# Patient Record
Sex: FEMALE | Race: BLACK OR AFRICAN AMERICAN | NOT HISPANIC OR LATINO | URBAN - METROPOLITAN AREA
[De-identification: names, ages, dates, MRNs, and addresses within clinical notes are randomized per-mention and may not be internally consistent; named-entity substitution may affect disease eponyms.]

---

## 2018-10-30 ENCOUNTER — EMERGENCY (EMERGENCY)
Facility: HOSPITAL | Age: 63
LOS: 0 days | Discharge: HOME | End: 2018-10-31
Attending: EMERGENCY MEDICINE
Payer: COMMERCIAL

## 2018-10-30 VITALS
HEIGHT: 66 IN | SYSTOLIC BLOOD PRESSURE: 160 MMHG | RESPIRATION RATE: 16 BRPM | DIASTOLIC BLOOD PRESSURE: 87 MMHG | HEART RATE: 63 BPM | OXYGEN SATURATION: 100 % | WEIGHT: 173.94 LBS | TEMPERATURE: 98 F

## 2018-10-30 LAB
ALBUMIN SERPL ELPH-MCNC: 4.2 G/DL — SIGNIFICANT CHANGE UP (ref 3.3–5)
ALP SERPL-CCNC: 78 U/L — SIGNIFICANT CHANGE UP (ref 40–120)
ALT FLD-CCNC: 37 U/L — SIGNIFICANT CHANGE UP (ref 12–78)
ANION GAP SERPL CALC-SCNC: 8 MMOL/L — SIGNIFICANT CHANGE UP (ref 5–17)
APTT BLD: 31.5 SEC — SIGNIFICANT CHANGE UP (ref 28.5–37)
AST SERPL-CCNC: 30 U/L — SIGNIFICANT CHANGE UP (ref 15–37)
BILIRUB SERPL-MCNC: 0.4 MG/DL — SIGNIFICANT CHANGE UP (ref 0.2–1.2)
BUN SERPL-MCNC: 16 MG/DL — SIGNIFICANT CHANGE UP (ref 7–23)
CALCIUM SERPL-MCNC: 9.5 MG/DL — SIGNIFICANT CHANGE UP (ref 8.5–10.1)
CHLORIDE SERPL-SCNC: 107 MMOL/L — SIGNIFICANT CHANGE UP (ref 96–108)
CK MB BLD-MCNC: 1.8 % — SIGNIFICANT CHANGE UP (ref 0–3.5)
CK MB BLD-MCNC: 1.8 % — SIGNIFICANT CHANGE UP (ref 0–3.5)
CK MB CFR SERPL CALC: 10.5 NG/ML — HIGH (ref 0.5–3.6)
CK MB CFR SERPL CALC: 8.6 NG/ML — HIGH (ref 0.5–3.6)
CK SERPL-CCNC: 469 U/L — HIGH (ref 26–192)
CK SERPL-CCNC: 583 U/L — HIGH (ref 26–192)
CO2 SERPL-SCNC: 27 MMOL/L — SIGNIFICANT CHANGE UP (ref 22–31)
CREAT SERPL-MCNC: 0.73 MG/DL — SIGNIFICANT CHANGE UP (ref 0.5–1.3)
D DIMER BLD IA.RAPID-MCNC: <150 NG/ML DDU — SIGNIFICANT CHANGE UP
GLUCOSE SERPL-MCNC: 76 MG/DL — SIGNIFICANT CHANGE UP (ref 70–99)
HCT VFR BLD CALC: 40.3 % — SIGNIFICANT CHANGE UP (ref 34.5–45)
HGB BLD-MCNC: 13.4 G/DL — SIGNIFICANT CHANGE UP (ref 11.5–15.5)
INR BLD: 1.2 RATIO — HIGH (ref 0.88–1.16)
MCHC RBC-ENTMCNC: 30.8 PG — SIGNIFICANT CHANGE UP (ref 27–34)
MCHC RBC-ENTMCNC: 33.3 GM/DL — SIGNIFICANT CHANGE UP (ref 32–36)
MCV RBC AUTO: 92.6 FL — SIGNIFICANT CHANGE UP (ref 80–100)
NRBC # BLD: 0 /100 WBCS — SIGNIFICANT CHANGE UP (ref 0–0)
PLATELET # BLD AUTO: 214 K/UL — SIGNIFICANT CHANGE UP (ref 150–400)
POTASSIUM SERPL-MCNC: 3.7 MMOL/L — SIGNIFICANT CHANGE UP (ref 3.5–5.3)
POTASSIUM SERPL-SCNC: 3.7 MMOL/L — SIGNIFICANT CHANGE UP (ref 3.5–5.3)
PROT SERPL-MCNC: 9.2 GM/DL — HIGH (ref 6–8.3)
PROTHROM AB SERPL-ACNC: 13.1 SEC — HIGH (ref 10–12.9)
RBC # BLD: 4.35 M/UL — SIGNIFICANT CHANGE UP (ref 3.8–5.2)
RBC # FLD: 12.8 % — SIGNIFICANT CHANGE UP (ref 10.3–14.5)
SODIUM SERPL-SCNC: 142 MMOL/L — SIGNIFICANT CHANGE UP (ref 135–145)
TROPONIN I SERPL-MCNC: <.015 NG/ML — SIGNIFICANT CHANGE UP (ref 0.01–0.04)
TROPONIN I SERPL-MCNC: <.015 NG/ML — SIGNIFICANT CHANGE UP (ref 0.01–0.04)
WBC # BLD: 6.52 K/UL — SIGNIFICANT CHANGE UP (ref 3.8–10.5)
WBC # FLD AUTO: 6.52 K/UL — SIGNIFICANT CHANGE UP (ref 3.8–10.5)

## 2018-10-30 PROCEDURE — 71046 X-RAY EXAM CHEST 2 VIEWS: CPT | Mod: 26

## 2018-10-30 PROCEDURE — 93010 ELECTROCARDIOGRAM REPORT: CPT

## 2018-10-30 PROCEDURE — 99284 EMERGENCY DEPT VISIT MOD MDM: CPT

## 2018-10-30 NOTE — ED PROVIDER NOTE - ATTENDING CONTRIBUTION TO CARE
62yoF; with no signif pmh; now p/w chest pain--left sided chest pain, sharp, intermittent, sticking sensation, lasting few seconds, non-exertional, nonpleuritic, occurred every few days in past 3 weeks. denies any associated sob, palpitations, lightheadedness, nausea, numbness/tingling. denies focal weakness. denies headache. denies abd pain.  EXAM--  General:     NAD, well-nourished, well-appearing  Head:     NC/AT, EOMI, oral mucosa moist  Neck:     trachea midline  Lungs:     CTA b/l, no w/r/r  CVS:     S1S2, RRR, no m/g/r  CHEST WALL: ttp @ left upper chest, no crepitus  Abd:     +BS, s/nt/nd, no organomegaly  Ext:    2+ radial and pedal pulses, no c/c/e  Neuro: AAOx3, no sensory/motor deficits  A/P: 62yoF with atypical chest pain  initial plan of care: labs, ekg, cardiac monitor, pulse ox  clinical course in ED: 62yoF; with no signif pmh; now p/w chest pain--left sided chest pain, sharp, intermittent, sticking sensation, lasting few seconds, non-exertional, nonpleuritic, occurred every few days in past 3 weeks. denies any associated sob, palpitations, lightheadedness, nausea, numbness/tingling. denies focal weakness. denies headache. denies abd pain.  EXAM--  General:     NAD, well-nourished, well-appearing  Head:     NC/AT, EOMI, oral mucosa moist  Neck:     trachea midline  Lungs:     CTA b/l, no w/r/r  CVS:     S1S2, RRR, no m/g/r  CHEST WALL: ttp @ left upper chest, no crepitus  Abd:     +BS, s/nt/nd, no organomegaly  Ext:    2+ radial and pedal pulses, no c/c/e  Neuro: AAOx3, no sensory/motor deficits  A/P: 62yoF with atypical chest pain  initial plan of care: labs, ekg, cardiac monitor, pulse ox  clinical course in ED: normal labs, normal cardiac monitoring, f/up with pmd and cardiology

## 2018-10-30 NOTE — ED PROVIDER NOTE - OBJECTIVE STATEMENT
62F otherwise healthy presents to the ER with chest pain intermittently x 2-3 weeks. Most recently had an episode of chest pain this afternoon at noon. She describes the pain as left sided, non radiating, sharp, lasting seconds. Pain is not pleuritic or exertional. She has no associated shortness of breath, nausea, vomiting, dizziness, diaphoresis, leg swelling. No cough or fever. She has no history of cardiac disease. She went to [place] in August, otherwise no recent travel, surgery or immobilization. No history of DVT/PE.

## 2018-10-30 NOTE — ED ADULT NURSE NOTE - NSIMPLEMENTINTERV_GEN_ALL_ED
Implemented All Universal Safety Interventions:  [place] to call system. Call bell, personal items and telephone within reach. Instruct patient to call for assistance. Room bathroom lighting operational. Non-slip footwear when patient is off stretcher. Physically safe environment: no spills, clutter or unnecessary equipment. Stretcher in lowest position, wheels locked, appropriate side rails in place.

## 2018-10-31 VITALS
OXYGEN SATURATION: 98 % | DIASTOLIC BLOOD PRESSURE: 71 MMHG | HEART RATE: 57 BPM | RESPIRATION RATE: 14 BRPM | SYSTOLIC BLOOD PRESSURE: 125 MMHG

## 2018-11-01 DIAGNOSIS — R07.9 CHEST PAIN, UNSPECIFIED: ICD-10-CM

## 2020-10-15 ENCOUNTER — RESULT REVIEW (OUTPATIENT)
Age: 65
End: 2020-10-15

## 2022-05-02 ENCOUNTER — APPOINTMENT (OUTPATIENT)
Dept: PULMONOLOGY | Facility: CLINIC | Age: 67
End: 2022-05-02
Payer: COMMERCIAL

## 2022-05-02 VITALS
SYSTOLIC BLOOD PRESSURE: 169 MMHG | DIASTOLIC BLOOD PRESSURE: 89 MMHG | OXYGEN SATURATION: 98 % | TEMPERATURE: 98.2 F | HEART RATE: 69 BPM

## 2022-05-02 PROCEDURE — 99204 OFFICE O/P NEW MOD 45 MIN: CPT | Mod: 25

## 2022-05-02 PROCEDURE — ZZZZZ: CPT

## 2022-05-02 PROCEDURE — 94010 BREATHING CAPACITY TEST: CPT

## 2022-05-02 PROCEDURE — 94726 PLETHYSMOGRAPHY LUNG VOLUMES: CPT

## 2022-05-02 PROCEDURE — 94729 DIFFUSING CAPACITY: CPT

## 2022-05-02 PROCEDURE — 88738 HGB QUANT TRANSCUTANEOUS: CPT

## 2022-05-02 PROCEDURE — 36415 COLL VENOUS BLD VENIPUNCTURE: CPT

## 2022-05-02 NOTE — ASSESSMENT
[FreeTextEntry1] : Advised her to do chest x-ray as scheduled.\par Venipuncture with labs drawn in office\par Start Duloxetine 20 mg p.o. daily for atypical chest pain/fibromyalgia\par Advised her to follow with you from hypertension perspective.\par Return for pulmonary follow-up in 2 weeks.

## 2022-05-02 NOTE — PROCEDURE
[FreeTextEntry1] : Pulmonary Function Test performed in my office today: Spirometry: Within normal limits; Lung Volume: Within normal limits; Resistance: Within normal limits; diffusion: Within normal limits.\par

## 2022-05-02 NOTE — REVIEW OF SYSTEMS
[Chest Discomfort] : chest discomfort [Negative] : Endocrine [Fatigue] : fatigue [Dyspnea] : dyspnea

## 2022-05-02 NOTE — CONSULT LETTER
[Dear  ___] : Dear  [unfilled], [Courtesy Letter:] : I had the pleasure of seeing your patient, [unfilled], in my office today. [Please see my note below.] : Please see my note below. [Consult Closing:] : Thank you very much for allowing me to participate in the care of this patient.  If you have any questions, please do not hesitate to contact me. [Sincerely,] : Sincerely, [FreeTextEntry3] : Dr. Denise Pastor

## 2022-05-02 NOTE — DISCUSSION/SUMMARY
[FreeTextEntry1] : Nydia is a patient with atypical right-sided chest pain for the last 6 months, could be secondary to fibromyalgia, rule out PE (much less likely).  Secondly, she is a patient with history of hypertension.

## 2022-05-02 NOTE — HISTORY OF PRESENT ILLNESS
[TextBox_4] : Nydia is a pleasant 66-year-old female with history of hypertension, she came in complaining of atypical right-sided chest pain shooting to the left for the last 6 months, she also has mild fatigue and shortness of breath, but no fever or chills\par She had undergone unremarkable chest x-ray in the past, and is scheduled for another chest x-ray at [facility] in the next several days.  \par She is a non smoker.\par

## 2022-05-03 ENCOUNTER — OUTPATIENT (OUTPATIENT)
Dept: OUTPATIENT SERVICES | Facility: HOSPITAL | Age: 67
LOS: 1 days | End: 2022-05-03
Payer: MEDICARE

## 2022-05-03 ENCOUNTER — APPOINTMENT (OUTPATIENT)
Dept: CT IMAGING | Facility: CLINIC | Age: 67
End: 2022-05-03
Payer: MEDICARE

## 2022-05-03 DIAGNOSIS — R07.89 OTHER CHEST PAIN: ICD-10-CM

## 2022-05-03 LAB
ANION GAP SERPL CALC-SCNC: 12 MMOL/L
BASOPHILS # BLD AUTO: 0.02 K/UL
BASOPHILS NFR BLD AUTO: 0.5 %
BUN SERPL-MCNC: 15 MG/DL
CALCIUM SERPL-MCNC: 9.7 MG/DL
CHLORIDE SERPL-SCNC: 109 MMOL/L
CO2 SERPL-SCNC: 25 MMOL/L
CREAT SERPL-MCNC: 0.6 MG/DL
DEPRECATED D DIMER PPP IA-ACNC: 376 NG/ML DDU
EGFR: 100 ML/MIN/1.73M2
EOSINOPHIL # BLD AUTO: 0.03 K/UL
EOSINOPHIL NFR BLD AUTO: 0.7 %
GLUCOSE SERPL-MCNC: 96 MG/DL
HCT VFR BLD CALC: 38.5 %
HGB BLD-MCNC: 12.2 G/DL
IMM GRANULOCYTES NFR BLD AUTO: 0.5 %
LYMPHOCYTES # BLD AUTO: 1.66 K/UL
LYMPHOCYTES NFR BLD AUTO: 37.6 %
MAN DIFF?: NORMAL
MCHC RBC-ENTMCNC: 30.7 PG
MCHC RBC-ENTMCNC: 31.7 GM/DL
MCV RBC AUTO: 96.7 FL
MONOCYTES # BLD AUTO: 0.3 K/UL
MONOCYTES NFR BLD AUTO: 6.8 %
NEUTROPHILS # BLD AUTO: 2.39 K/UL
NEUTROPHILS NFR BLD AUTO: 53.9 %
PLATELET # BLD AUTO: 189 K/UL
POCT - HEMOGLOBIN (HGB), QUANTITATIVE, TRANSCUTANEOUS: 13.3
POTASSIUM SERPL-SCNC: 4.8 MMOL/L
RBC # BLD: 3.98 M/UL
RBC # FLD: 13.5 %
SODIUM SERPL-SCNC: 146 MMOL/L
WBC # FLD AUTO: 4.42 K/UL

## 2022-05-03 PROCEDURE — 71275 CT ANGIOGRAPHY CHEST: CPT | Mod: 26,MH

## 2022-05-03 PROCEDURE — 71275 CT ANGIOGRAPHY CHEST: CPT

## 2022-05-10 LAB — TOTAL IGE SMQN RAST: 121 KU/L

## 2022-05-23 ENCOUNTER — LABORATORY RESULT (OUTPATIENT)
Age: 67
End: 2022-05-23

## 2022-05-23 ENCOUNTER — APPOINTMENT (OUTPATIENT)
Dept: PULMONOLOGY | Facility: CLINIC | Age: 67
End: 2022-05-23
Payer: MEDICARE

## 2022-05-23 VITALS — HEART RATE: 67 BPM | SYSTOLIC BLOOD PRESSURE: 167 MMHG | DIASTOLIC BLOOD PRESSURE: 80 MMHG | OXYGEN SATURATION: 98 %

## 2022-05-23 PROCEDURE — 99214 OFFICE O/P EST MOD 30 MIN: CPT | Mod: 25

## 2022-05-23 PROCEDURE — 36415 COLL VENOUS BLD VENIPUNCTURE: CPT

## 2022-05-23 NOTE — REVIEW OF SYSTEMS
[Fatigue] : fatigue [Dyspnea] : dyspnea [Chest Discomfort] : chest discomfort [Negative] : Endocrine

## 2022-05-24 NOTE — DISCUSSION/SUMMARY
[FreeTextEntry1] : Improving atypical right-sided chest pain likely secondary to fibromyalgia.  Left upper lobe lung nodule, need to definitively rule out malignancy, rule out postinflammatory changes.

## 2022-05-24 NOTE — PROCEDURE
[FreeTextEntry1] : \par   CT Angio Chest PE Protocol w/ IV Cont             Final\par \par No Documents Attached\par \par \par \par \par   EXAM: [id number] - CT ANGIO CHEST PULM [facility]  - ORDERED BY: CORRIE ACUNA\par \par \par PROCEDURE DATE:  05/03/2022\par \par \par \par INTERPRETATION:  Clinical information: Atypical chest pain, elevated d-dimer, assess for PE\par \par TECHNIQUE: A volumetric acquisition of the chest was obtained from the thoracic inlet to the upper abdomen during dynamic administration of intravenous contrast. 3D MIP images were provided.\par \par COMPARISON: No prior chest CT for comparison\par \par FINDINGS:\par \par CTA: The study is technically adequate with a good contrast bolus to the pulmonary arteries. There are no filling defects in the pulmonary artery or its branches. The cardiac chambers appear normal in size. There is mild coronary artery calcification. There is no pericardial effusion. The aorta is normal in caliber. There is a common trunk of the brachiocephalic and left common carotid arteries, normal variant.\par \par Lungs/Airways/Pleura: There is a 1.3 x 1.1 cm mildly lobulated subpleural left lower lobe nodule on image 347 series 3. Central airways are patent. No pleural effusion.\par \par Mediastinum/Lymph nodes: No thoracic adenopathy. There is enlargement left thyroid lobe contains ill-defined areas of low-attenuation\par \par Upper Abdomen: There is partially imaged 1.3 cm right renal artery aneurysm.\par \par Bones and Soft Tissues: No aggressive osseous lesions.\par \par IMPRESSION:\par \par 1.  No pulmonary thromboembolism\par \par 2.  Noncalcified 1.3 x 1.1 cm mildly lobulated left lower lobe nodule. Consider further evaluation with PET/CT, sampling or short-term follow-up noncontrast chest CT.\par \par 3.  Partially imaged 1.3 cm right renal artery aneurysm.\par \par --- End of Report ---\par \par \par \par \par \par \par ELICEO RAMOS M.D., Attending Radiologist\par This document has been electronically signed. May  3 2022  6:14PM\par \par  \par \par  Ordered by: ACUNA, CORRIE       Collected/Examined: 03May2022 06:02PM       \par Verified by: CORRIE ACUNA 10May2022 06:13PM       \par  Result Communication: No patient communication needed at this time;\par Stage: Final       \par  Performed at: [facility]       Resulted: 03May2022 06:07PM       Last Updated: 10May2022 06:13PM       Accession: [id number]

## 2022-05-24 NOTE — ASSESSMENT
[FreeTextEntry1] : Increase Duloxetine to 40mg QD for right-sided chest pain.\par Get PET scan for further evaluation.\par Venipuncture with labs drawn in office\par \par

## 2022-05-24 NOTE — REASON FOR VISIT
[Follow-Up] : a follow-up visit [Chest Pain] : chest pain [TextBox_44] : Right-sided chest pain is slightly better

## 2022-06-05 LAB
ACE BLD-CCNC: 15 U/L
ALTERN TENCAPG(M6): 2.4 MCG/ML
ANA PAT FLD IF-IMP: ABNORMAL
ANA SER IF-ACNC: ABNORMAL
ASPER FUMCAPG(M3): 4.3 MCG/ML
AUREOBASCAPG(M12): 2.7 MCG/ML
CK SERPL-CCNC: 1210 U/L
CRP SERPL-MCNC: <3 MG/L
ENA JO1 AB SER IA-ACNC: <0.2 AL
ENA SCL70 IGG SER IA-ACNC: <0.2 AL
ERYTHROCYTE [SEDIMENTATION RATE] IN BLOOD BY WESTERGREN METHOD: 25 MM/HR
MICROPOLYCAPG(M22): <2 MCG/ML
PENIC CHRYCAPG(M1): 2.9 MCG/ML
PHOMA BETAE IGG: 2.1 MCG/ML
RHEUMATOID FACT SER QL: <10 IU/ML
TRICHODERMA VIRIDE IGG: <2 MCG/ML

## 2022-06-13 ENCOUNTER — APPOINTMENT (OUTPATIENT)
Dept: PULMONOLOGY | Facility: CLINIC | Age: 67
End: 2022-06-13
Payer: MEDICARE

## 2022-06-13 VITALS
TEMPERATURE: 97.6 F | DIASTOLIC BLOOD PRESSURE: 95 MMHG | WEIGHT: 180 LBS | HEIGHT: 66 IN | SYSTOLIC BLOOD PRESSURE: 169 MMHG | OXYGEN SATURATION: 98 % | BODY MASS INDEX: 28.93 KG/M2 | HEART RATE: 65 BPM

## 2022-06-13 PROCEDURE — 99214 OFFICE O/P EST MOD 30 MIN: CPT

## 2022-06-13 NOTE — REASON FOR VISIT
[Follow-Up] : a follow-up visit [Chest Pain] : chest pain [TextBox_44] : Right-sided chest pain is slightly better with Duloxetine 20mg QD

## 2022-06-13 NOTE — DISCUSSION/SUMMARY
[FreeTextEntry1] : Improving atypical right-sided chest pain likely secondary to fibromyalgia.  Left upper lobe lung nodule, without PET FDG uptake, favors benign etiology.

## 2022-06-13 NOTE — PROCEDURE
[FreeTextEntry1] : PET scan done on June 6, 2022 showed no abnormal hypermetabolic activity to suggest malignancy at this time.  No significant change in the 1.5 cm left lower lobe pulmonary nodule.  This nodule is not significantly hypermetabolic on corresponding PET images.  These findings favor benign etiology.  Additional 0.3 cm right upper lobe pulmonary nodule, which is below the size resolution of the pet imaging.  Scattered diverticula without evidence of diverticulitis.

## 2022-06-13 NOTE — ASSESSMENT
[FreeTextEntry1] : Continue duloxetine 20mg QD for fibromyalgia/atypical right-sided chest pain.\par Discussed with Nydia at length regarding aforementioned PET scan findings in the office today, will repeat chest CT scan for follow-up in 6 months, and periodically thereafter, to ensure the stability of the left lower lobe lung nodule.

## 2022-09-12 ENCOUNTER — APPOINTMENT (OUTPATIENT)
Dept: PULMONOLOGY | Facility: CLINIC | Age: 67
End: 2022-09-12

## 2022-10-24 ENCOUNTER — APPOINTMENT (OUTPATIENT)
Dept: PULMONOLOGY | Facility: CLINIC | Age: 67
End: 2022-10-24

## 2022-10-24 VITALS — OXYGEN SATURATION: 98 % | DIASTOLIC BLOOD PRESSURE: 90 MMHG | HEART RATE: 69 BPM | SYSTOLIC BLOOD PRESSURE: 173 MMHG

## 2022-10-24 DIAGNOSIS — R91.1 SOLITARY PULMONARY NODULE: ICD-10-CM

## 2022-10-24 PROCEDURE — 99214 OFFICE O/P EST MOD 30 MIN: CPT

## 2022-10-24 RX ORDER — DULOXETINE HYDROCHLORIDE 20 MG/1
20 CAPSULE, DELAYED RELEASE PELLETS ORAL
Qty: 30 | Refills: 5 | Status: ACTIVE | COMMUNITY
Start: 2022-05-02 | End: 1900-01-01

## 2022-10-24 NOTE — ASSESSMENT
[FreeTextEntry1] : Continue duloxetine 20 mg QD for fibromyalgia/atypical right-sided chest pain.\par Discussed with Nydia at length regarding aforementioned PET scan findings in the office today, will repeat chest Repeat chest CT now

## 2022-10-24 NOTE — REASON FOR VISIT
[Follow-Up] : a follow-up visit [Chest Pain] : chest pain [TextBox_44] : Right-sided chest pain is slightly better with Duloxetine 20 mg QD

## 2022-11-21 ENCOUNTER — APPOINTMENT (OUTPATIENT)
Dept: PULMONOLOGY | Facility: CLINIC | Age: 67
End: 2022-11-21

## 2024-04-22 ENCOUNTER — APPOINTMENT (OUTPATIENT)
Dept: PULMONOLOGY | Facility: CLINIC | Age: 69
End: 2024-04-22
Payer: MEDICARE

## 2024-04-22 VITALS
WEIGHT: 182 LBS | TEMPERATURE: 97.7 F | SYSTOLIC BLOOD PRESSURE: 163 MMHG | DIASTOLIC BLOOD PRESSURE: 93 MMHG | OXYGEN SATURATION: 99 % | BODY MASS INDEX: 29.25 KG/M2 | HEART RATE: 69 BPM | HEIGHT: 66 IN

## 2024-04-22 DIAGNOSIS — R05.9 COUGH, UNSPECIFIED: ICD-10-CM

## 2024-04-22 DIAGNOSIS — R07.89 OTHER CHEST PAIN: ICD-10-CM

## 2024-04-22 DIAGNOSIS — R06.83 SNORING: ICD-10-CM

## 2024-04-22 PROCEDURE — 95012 NITRIC OXIDE EXP GAS DETER: CPT

## 2024-04-22 PROCEDURE — 99214 OFFICE O/P EST MOD 30 MIN: CPT | Mod: 25

## 2024-04-22 PROCEDURE — 94010 BREATHING CAPACITY TEST: CPT

## 2024-04-22 NOTE — ASSESSMENT
[FreeTextEntry1] : Obtained and reviewed spirometry and NIOX results with patient today.  Continue duloxetine 20 mg QD as needed for fibromyalgia/atypical right-sided chest pain. Return for pulmonary follow up in 3 months. Offered to do home sleep study to definitively rule out obstructive sleep apnea, but patient wishes to hold off on it.

## 2024-04-22 NOTE — DISCUSSION/SUMMARY
[FreeTextEntry1] : Ms. ROMI HINKLE is an 68 year old female, history of lung nodule.  Atypical chest pain likely secondary to anxiety.  Patient appears stable from a pulmonary perspective. Snoring likely secondary to obstructive sleep apnea.

## 2024-04-22 NOTE — REVIEW OF SYSTEMS
[Chest Discomfort] : chest discomfort [Negative] : Endocrine [Fatigue] : fatigue [TextBox_30] : Snoring

## 2024-04-22 NOTE — HISTORY OF PRESENT ILLNESS
[TextBox_4] : ROMI HINKLE is a 68 year old female, with history of lung nodule, who presents to the office for follow up evaluation. Patient reports of having intermittent symptoms of sharp chest pain and snoring. She also states of having intermittent symptoms of dyspnea on exertion. Patient denies of having any symptoms of wheezing. She states that she is unsure if she has symptoms of excessive daytime sleepiness as she stays up late watching movies and wakes up early to go to work. Patient denies of having any cigarette smoking history.

## 2024-04-22 NOTE — ADDENDUM
[FreeTextEntry1] : I, Issa Dsouzaeleno, acted solely as a scribe for Dr. Denise Pastor D.O., on this date 04/22/2024.   All medical record entries made by the Scribe were at my, Dr. Denise Pastor D.O., direction and personally dictated by me on 04/22/2024. I have reviewed the chart and agree that the record accurately reflects my personal performance of the history, physical exam, assessment and plan. I have also personally directed, reviewed, and agreed with the chart.

## 2025-05-05 ENCOUNTER — APPOINTMENT (OUTPATIENT)
Dept: PULMONOLOGY | Facility: CLINIC | Age: 70
End: 2025-05-05
Payer: MEDICARE

## 2025-05-05 VITALS
OXYGEN SATURATION: 98 % | HEIGHT: 66 IN | SYSTOLIC BLOOD PRESSURE: 176 MMHG | BODY MASS INDEX: 29.89 KG/M2 | WEIGHT: 186 LBS | DIASTOLIC BLOOD PRESSURE: 96 MMHG | TEMPERATURE: 97.6 F | HEART RATE: 69 BPM

## 2025-05-05 DIAGNOSIS — R07.89 OTHER CHEST PAIN: ICD-10-CM

## 2025-05-05 DIAGNOSIS — R91.1 SOLITARY PULMONARY NODULE: ICD-10-CM

## 2025-05-05 DIAGNOSIS — R05.9 COUGH, UNSPECIFIED: ICD-10-CM

## 2025-05-05 PROCEDURE — 94010 BREATHING CAPACITY TEST: CPT

## 2025-05-05 PROCEDURE — 99214 OFFICE O/P EST MOD 30 MIN: CPT | Mod: 25

## 2025-05-05 PROCEDURE — 95012 NITRIC OXIDE EXP GAS DETER: CPT

## 2025-07-07 ENCOUNTER — APPOINTMENT (OUTPATIENT)
Dept: PULMONOLOGY | Facility: CLINIC | Age: 70
End: 2025-07-07